# Patient Record
Sex: FEMALE | Race: BLACK OR AFRICAN AMERICAN | Employment: OTHER | URBAN - METROPOLITAN AREA
[De-identification: names, ages, dates, MRNs, and addresses within clinical notes are randomized per-mention and may not be internally consistent; named-entity substitution may affect disease eponyms.]

---

## 2017-03-28 ENCOUNTER — HOSPITAL ENCOUNTER (OUTPATIENT)
Dept: MAMMOGRAPHY | Age: 62
Discharge: HOME | End: 2017-03-28
Attending: NURSE PRACTITIONER
Payer: COMMERCIAL

## 2017-03-28 DIAGNOSIS — Z12.31 VISIT FOR SCREENING MAMMOGRAM: ICD-10-CM

## 2017-03-28 PROCEDURE — 77067 SCR MAMMO BI INCL CAD: CPT

## 2018-08-21 RX ORDER — ASPIRIN 500 MG
250 TABLET, DELAYED RELEASE (ENTERIC COATED) ORAL DAILY
COMMUNITY

## 2018-08-21 NOTE — PERIOP NOTES
PAT - SURGICAL PRE-ADMISSION INSTRUCTIONS    NAME:  Duarte Ricketts                                                          TODAY'S DATE:  8/21/2018    SURGERY DATE:  8/24/2018                                  SURGERY ARRIVAL TIME:   0915    1. Do NOT eat or drink anything, including candy or gum, after MIDNIGHT on 8/23/18 , unless you have specific instructions from your Surgeon or Anesthesia Provider to do so. 2. No smoking on the day of surgery. 3. No alcohol 24 hours prior to the day of surgery. 4. No recreational drugs for one week prior to the day of surgery. 5. Leave all valuables, including money/purse, at home. 6. Remove all jewelry, nail polish, makeup (including mascara); no lotions, powders, deodorant, or perfume/cologne/after shave. 7. Glasses/Contact lenses and Dentures may be worn to the hospital.  They will be removed prior to surgery. 8. Call your doctor if symptoms of a cold or illness develop within 24 ours prior to surgery. 9. AN ADULT MUST DRIVE YOU HOME AFTER OUTPATIENT SURGERY. 10. If you are having an OUTPATIENT procedure, please make arrangements for a responsible adult to be with you for 24 hours after your surgery. 11. If you are admitted to the hospital, you will be assigned to a bed after surgery is complete. Normally a family member will not be able to see you until you are in your assigned bed. 15. Family is encouraged to accompany you to the hospital.  We ask visitors in the treatment area to be limited to ONE person at a time to ensure patient privacy. EXCEPTIONS WILL BE MADE AS NEEDED. 15. Children under 12 are discouraged from entering the treatment area and need to be supervised by an adult when in the waiting room. Special Instructions:    Bring inhaler., HOLD oral diabetic medication on the MORNING OF surgery. , Wear your HOME insulin pump to the hospital, set at the [address].   On the morning of surgery, the continued use of the pump will be determined by the type of surgery procedure/expected length of surgery/positioning/equipment expected. Bring EXTRA infusion supplies with you to the hospital. , Complete bowel prep per MD instructions., STOP anticoagulants AT LEAST 1 WEEK PRIOR to your surgery or, follow other MD instructions:  PLAVIX & ASPIRIN    Patient Prep:    shower with anti-bacterial soap    These surgical instructions were reviewed with PATIENT during the PAT PHONE CALL. Directions: On the morning of surgery, please go to the [facility]. Enter the building from the [facility] entrance, 1st floor (next to the Emergency Room entrance). Take the elevator to the 2nd floor. Sign in at the Registration Desk.     If you have any questions and/or concerns, please do not hesitate to call:  (Prior to the day of surgery)  [facility] unit:  [phone]  (Day of surgery)  [facility] unit:  [phone]

## 2018-08-23 ENCOUNTER — ANESTHESIA EVENT (OUTPATIENT)
Dept: ENDOSCOPY | Age: 63
End: 2018-08-23
Payer: COMMERCIAL

## 2018-08-24 ENCOUNTER — ANESTHESIA (OUTPATIENT)
Dept: ENDOSCOPY | Age: 63
End: 2018-08-24
Payer: COMMERCIAL

## 2018-08-24 ENCOUNTER — HOSPITAL ENCOUNTER (OUTPATIENT)
Age: 63
Setting detail: OUTPATIENT SURGERY
Discharge: HOME | End: 2018-08-24
Attending: INTERNAL MEDICINE | Admitting: INTERNAL MEDICINE
Payer: COMMERCIAL

## 2018-08-24 VITALS
DIASTOLIC BLOOD PRESSURE: 55 MMHG | RESPIRATION RATE: 16 BRPM | WEIGHT: 253 LBS | SYSTOLIC BLOOD PRESSURE: 99 MMHG | BODY MASS INDEX: 43.19 KG/M2 | HEART RATE: 85 BPM | OXYGEN SATURATION: 95 % | TEMPERATURE: 97.6 F | HEIGHT: 64 IN

## 2018-08-24 PROBLEM — Z12.11 SCREENING FOR COLON CANCER: Status: ACTIVE | Noted: 2018-08-24

## 2018-08-24 LAB
BUN BLD-MCNC: 24 MG/DL (ref 7–18)
CHLORIDE BLD-SCNC: 98 MMOL/L (ref 100–108)
GLUCOSE BLD STRIP.AUTO-MCNC: 128 MG/DL (ref 74–106)
HCT VFR BLD CALC: 34 % (ref 36–49)
HGB BLD-MCNC: 11.6 G/DL (ref 12–16)
POTASSIUM BLD-SCNC: 3.5 MMOL/L (ref 3.5–5.5)
SODIUM BLD-SCNC: 140 MMOL/L (ref 136–145)

## 2018-08-24 PROCEDURE — 77030031670 HC DEV INFL ENDOTEK BIG60 MRTM -B: Performed by: INTERNAL MEDICINE

## 2018-08-24 PROCEDURE — 74011250636 HC RX REV CODE- 250/636

## 2018-08-24 PROCEDURE — 74011250636 HC RX REV CODE- 250/636: Performed by: NURSE ANESTHETIST, CERTIFIED REGISTERED

## 2018-08-24 PROCEDURE — 76040000019: Performed by: INTERNAL MEDICINE

## 2018-08-24 PROCEDURE — 76060000031 HC ANESTHESIA FIRST 0.5 HR: Performed by: INTERNAL MEDICINE

## 2018-08-24 PROCEDURE — 84295 ASSAY OF SERUM SODIUM: CPT

## 2018-08-24 RX ORDER — SODIUM CHLORIDE 9 MG/ML
25 INJECTION, SOLUTION INTRAVENOUS CONTINUOUS
Status: CANCELLED | OUTPATIENT
Start: 2018-08-24 | End: 2018-08-24

## 2018-08-24 RX ORDER — SODIUM CHLORIDE 0.9 % (FLUSH) 0.9 %
5-10 SYRINGE (ML) INJECTION AS NEEDED
Status: CANCELLED | OUTPATIENT
Start: 2018-08-24 | End: 2018-08-24

## 2018-08-24 RX ORDER — INSULIN LISPRO 100 [IU]/ML
INJECTION, SOLUTION INTRAVENOUS; SUBCUTANEOUS ONCE
Status: DISCONTINUED | OUTPATIENT
Start: 2018-08-25 | End: 2018-08-24 | Stop reason: HOSPADM

## 2018-08-24 RX ORDER — PROPOFOL 10 MG/ML
INJECTION, EMULSION INTRAVENOUS
Status: DISCONTINUED | OUTPATIENT
Start: 2018-08-24 | End: 2018-08-24 | Stop reason: HOSPADM

## 2018-08-24 RX ORDER — SODIUM CHLORIDE 0.9 % (FLUSH) 0.9 %
5-10 SYRINGE (ML) INJECTION AS NEEDED
Status: DISCONTINUED | OUTPATIENT
Start: 2018-08-24 | End: 2018-08-24 | Stop reason: HOSPADM

## 2018-08-24 RX ORDER — SODIUM CHLORIDE, SODIUM LACTATE, POTASSIUM CHLORIDE, CALCIUM CHLORIDE 600; 310; 30; 20 MG/100ML; MG/100ML; MG/100ML; MG/100ML
50 INJECTION, SOLUTION INTRAVENOUS CONTINUOUS
Status: DISCONTINUED | OUTPATIENT
Start: 2018-08-25 | End: 2018-08-24 | Stop reason: HOSPADM

## 2018-08-24 RX ORDER — SODIUM CHLORIDE 0.9 % (FLUSH) 0.9 %
5-10 SYRINGE (ML) INJECTION EVERY 8 HOURS
Status: DISCONTINUED | OUTPATIENT
Start: 2018-08-24 | End: 2018-08-24 | Stop reason: HOSPADM

## 2018-08-24 RX ORDER — PROPOFOL 10 MG/ML
INJECTION, EMULSION INTRAVENOUS AS NEEDED
Status: DISCONTINUED | OUTPATIENT
Start: 2018-08-24 | End: 2018-08-24 | Stop reason: HOSPADM

## 2018-08-24 RX ORDER — FAMOTIDINE 20 MG/1
20 TABLET, FILM COATED ORAL ONCE
Status: DISCONTINUED | OUTPATIENT
Start: 2018-08-25 | End: 2018-08-24 | Stop reason: HOSPADM

## 2018-08-24 RX ORDER — SODIUM CHLORIDE 0.9 % (FLUSH) 0.9 %
5-10 SYRINGE (ML) INJECTION EVERY 8 HOURS
Status: CANCELLED | OUTPATIENT
Start: 2018-08-24 | End: 2018-08-24

## 2018-08-24 RX ADMIN — PROPOFOL 40 MG: 10 INJECTION, EMULSION INTRAVENOUS at 10:36

## 2018-08-24 RX ADMIN — SODIUM CHLORIDE, SODIUM LACTATE, POTASSIUM CHLORIDE, AND CALCIUM CHLORIDE 50 ML/HR: 600; 310; 30; 20 INJECTION, SOLUTION INTRAVENOUS at 09:35

## 2018-08-24 RX ADMIN — PROPOFOL 160 MCG/KG/MIN: 10 INJECTION, EMULSION INTRAVENOUS at 10:36

## 2018-08-24 NOTE — DISCHARGE INSTRUCTIONS
COLONOSCOPY DISCHARGE INSTRUCTIONS    You have just had an examination of your colon (large intestine). The medication given to you during your procedure will be acting in your body for the next 12 hours, gradually wearing off. Your face may be flushed, skin may be warm and sweaty, you might feel a little bloated or nauseated and sleepy. Please be aware of the followin. For the next 12 hours you SHOULD NOT:    a. Drive, Operate any machinery. b. Drink alcohol.  c. Engage in activities that require mental sharpness or manual dexterity such as cooking. d. Take any medications other than prescribed by a physician.  e. Make any legal or financial decisions. 2. Call this office immediately, if:    a. Onset of new or increase of maureen rectal bleeding.  b. Fever > 101  c. Increased abdominal pain    Additional instructions:    a. Diet as recommended  b. Due to risk of dehydration after colon prep and sedation, avoid extended periods of time outdoors if the day is hot and humid. c. If biopsies were taken, you will receive a post card or telephone call with results of your biopsies and suggestion for your next colonoscopy. Please allow us at least 3 weeks to get back with you about your results. If we have not contacted you by the, please call us for results. Ph# [phone]  d. If you had polyp(s) removed DO NOT TAKE NSAIDS (Aspirin, Advil, Aleve, Naproxyn, Ibuprofen, etc) for 2 weeks. Tylenol is OK to use. DISCHARGE SUMMARY from Nurse    PATIENT INSTRUCTIONS:    After general anesthesia or intravenous sedation, for 24 hours or while taking prescription Narcotics:  · Limit your activities  · Do not drive and operate hazardous machinery  · Do not make important personal or business decisions  · Do  not drink alcoholic beverages  · If you have not urinated within 8 hours after discharge, please contact your surgeon on call.     Report the following to your surgeon:  · Excessive pain, swelling, redness or odor of or around the surgical area  · Temperature over 100.5  · Nausea and vomiting lasting longer than 4 hours or if unable to take medications  · Any signs of decreased circulation or nerve impairment to extremity: change in color, persistent  numbness, tingling, coldness or increase pain  · Any questions    What to do at Home:  Recommended activity: Activity as tolerated and no driving for today,       These are general instructions for a healthy lifestyle:    No smoking/ No tobacco products/ Avoid exposure to second hand smoke  Surgeon General's Warning:  Quitting smoking now greatly reduces serious risk to your health. Obesity, smoking, and sedentary lifestyle greatly increases your risk for illness    A healthy diet, regular physical exercise & weight monitoring are important for maintaining a healthy lifestyle    You may be retaining fluid if you have a history of heart failure or if you experience any of the following symptoms:  Weight gain of 3 pounds or more overnight or 5 pounds in a week, increased swelling in our hands or feet or shortness of breath while lying flat in bed. Please call your doctor as soon as you notice any of these symptoms; do not wait until your next office visit. Recognize signs and symptoms of STROKE:    F-face looks uneven    A-arms unable to move or move unevenly    S-speech slurred or non-existent    T-time-call 911 as soon as signs and symptoms begin-DO NOT go       Back to bed or wait to see if you get better-TIME IS BRAIN. Warning Signs of HEART ATTACK     Call 911 if you have these symptoms:   Chest discomfort. Most heart attacks involve discomfort in the center of the chest that lasts more than a few minutes, or that goes away and comes back. It can feel like uncomfortable pressure, squeezing, fullness, or pain.  Discomfort in other areas of the upper body.  Symptoms can include pain or discomfort in one or both arms, the back, neck, jaw, or stomach.  Shortness of breath with or without chest discomfort.  Other signs may include breaking out in a cold sweat, nausea, or lightheadedness. Don't wait more than five minutes to call 911 - MINUTES MATTER! Fast action can save your life. Calling 911 is almost always the fastest way to get lifesaving treatment. Emergency Medical Services staff can begin treatment when they arrive -- up to an hour sooner than if someone gets to the hospital by car. The discharge information has been reviewed with the patient. The patient verbalized understanding. Discharge medications reviewed with the patient and appropriate educational materials and side effects teaching were provided. Patient armband removed and given to patient to take home.   Patient was informed of the privacy risks if armband lost or stolen    ___________________________________________________________________________________________________________________________________

## 2018-08-24 NOTE — IP AVS SNAPSHOT
[address] [address] Munira Wright  
[phone] Patient: Samara Caraballo MRN: [id number] About your hospitalization You were admitted on:  August 24, 2018 You last received care in the:  [facility] PHASE 2 RECOVERY You were discharged on:  August 24, 2018 Why you were hospitalized Your primary diagnosis was:  Screening For Colon Cancer Follow-up Information Follow up With Details Comments Contact Info MD Norbert Th[address]. [address]  
[address] [facility] [address] 
[phone] Rehana Em MD  As needed [facility] [address] [address] 
[phone] Discharge Orders None A check zuleima indicates which time of day the medication should be taken. My Medications CONTINUE taking these medications Instructions Each Dose to Equal  
 Morning Noon Evening Bedtime  
 aspirin  mg tablet Your last dose was: Your next dose is: Take 250 mg by mouth daily. 250 mg  
    
   
   
   
  
 BYDUREON 2 mg Serr Generic drug:  exenatide microspheres Your last dose was: Your next dose is:    
   
   
 2 mg by SubCUTAneous route every seven (7) days. 2 mg  
    
   
   
   
  
 glimepiride 4 mg tablet Commonly known as:  AMARYL Your last dose was: Your next dose is: Take 4 mg by mouth every morning. 4 mg LANTUS U-100 INSULIN 100 unit/mL injection Generic drug:  insulin glargine Your last dose was: Your next dose is:    
   
   
 39 Units by SubCUTAneous route nightly. 39 Units LASIX PO Your last dose was: Your next dose is: Take 40 mg by mouth daily. 40 mg  
    
   
   
   
  
 losartan-hydroCHLOROthiazide 100-25 mg per tablet Commonly known as:  HYZAAR Your last dose was: Your next dose is: Take 1 Tab by mouth daily. 1 Tab PLAVIX 75 mg Tab Generic drug:  clopidogrel Your last dose was: Your next dose is: Take 75 mg by mouth daily. 75 mg  
    
   
   
   
  
 simvastatin 20 mg tablet Commonly known as:  ZOCOR Your last dose was: Your next dose is: Take 20 mg by mouth nightly. 20 mg  
    
   
   
   
  
 SINGULAIR 10 mg tablet Generic drug:  montelukast  
   
Your last dose was: Your next dose is: Take 10 mg by mouth daily. 10 mg  
    
   
   
   
  
 spironolactone 25 mg tablet Commonly known as:  ALDACTONE Your last dose was: Your next dose is: Take 25 mg by mouth daily. 25 mg  
    
   
   
   
  
 SYMBICORT 160-4.5 mcg/actuation Hfaa Generic drug:  budesonide-formoterol Your last dose was: Your next dose is: Take 2 Puffs by inhalation two (2) times daily as needed. 2 Puff VENTOLIN HFA 90 mcg/actuation inhaler Generic drug:  albuterol Your last dose was: Your next dose is: Take 2 Puffs by inhalation every four (4) hours as needed for Wheezing. Inhale 1-2 puffs every 4-6 hours as needed. 2 Puff Discharge Instructions COLONOSCOPY DISCHARGE INSTRUCTIONS You have just had an examination of your colon (large intestine). The medication given to you during your procedure will be acting in your body for the next 12 hours, gradually wearing off. Your face may be flushed, skin may be warm and sweaty, you might feel a little bloated or nauseated and sleepy. Please be aware of the followin. For the next 12 hours you SHOULD NOT: 
 
a. Drive, Operate any machinery. b. Drink alcohol. 
c. Engage in activities that require mental sharpness or manual dexterity such as cooking. d. Take any medications other than prescribed by a physician. 
e. Make any legal or financial decisions. 2. Call this office immediately, if: 
 
a. Onset of new or increase of maureen rectal bleeding. 
b. Fever > 101 
c. Increased abdominal pain Additional instructions: 
 
a. Diet as recommended 
b. Due to risk of dehydration after colon prep and sedation, avoid extended periods of time outdoors if the day is hot and humid. c. If biopsies were taken, you will receive a post card or telephone call with results of your biopsies and suggestion for your next colonoscopy. Please allow us at least 3 weeks to get back with you about your results. If we have not contacted you by the, please call us for results. Ph# [phone] 
d. If you had polyp(s) removed DO NOT TAKE NSAIDS (Aspirin, Advil, Aleve, Naproxyn, Ibuprofen, etc) for 2 weeks. Tylenol is OK to use. DISCHARGE SUMMARY from Nurse PATIENT INSTRUCTIONS: 
 
After general anesthesia or intravenous sedation, for 24 hours or while taking prescription Narcotics: · Limit your activities · Do not drive and operate hazardous machinery · Do not make important personal or business decisions · Do  not drink alcoholic beverages · If you have not urinated within 8 hours after discharge, please contact your surgeon on call. Report the following to your surgeon: 
· Excessive pain, swelling, redness or odor of or around the surgical area · Temperature over 100.5 · Nausea and vomiting lasting longer than 4 hours or if unable to take medications · Any signs of decreased circulation or nerve impairment to extremity: change in color, persistent  numbness, tingling, coldness or increase pain · Any questions What to do at Home: 
Recommended activity: Activity as tolerated and no driving for today, These are general instructions for a healthy lifestyle: No smoking/ No tobacco products/ Avoid exposure to second hand smoke Surgeon General's Warning:  Quitting smoking now greatly reduces serious risk to your health. Obesity, smoking, and sedentary lifestyle greatly increases your risk for illness A healthy diet, regular physical exercise & weight monitoring are important for maintaining a healthy lifestyle You may be retaining fluid if you have a history of heart failure or if you experience any of the following symptoms:  Weight gain of 3 pounds or more overnight or 5 pounds in a week, increased swelling in our hands or feet or shortness of breath while lying flat in bed. Please call your doctor as soon as you notice any of these symptoms; do not wait until your next office visit. Recognize signs and symptoms of STROKE: 
 
F-face looks uneven A-arms unable to move or move unevenly S-speech slurred or non-existent T-time-call 911 as soon as signs and symptoms begin-DO NOT go Back to bed or wait to see if you get better-TIME IS BRAIN. Warning Signs of HEART ATTACK Call 911 if you have these symptoms: 
? Chest discomfort. Most heart attacks involve discomfort in the center of the chest that lasts more than a few minutes, or that goes away and comes back. It can feel like uncomfortable pressure, squeezing, fullness, or pain. ? Discomfort in other areas of the upper body. Symptoms can include pain or discomfort in one or both arms, the back, neck, jaw, or stomach. ? Shortness of breath with or without chest discomfort. ? Other signs may include breaking out in a cold sweat, nausea, or lightheadedness. Don't wait more than five minutes to call [address]! Fast action can save your life. Calling 911 is almost always the fastest way to get lifesaving treatment. Emergency Medical Services staff can begin treatment when they arrive  up to an hour sooner than if someone gets to the hospital by car. The discharge information has been reviewed with the patient.   The patient verbalized understanding. Discharge medications reviewed with the patient and appropriate educational materials and side effects teaching were provided. Patient armband removed and given to patient to take home. Patient was informed of the privacy risks if armband lost or stolen 
 
___________________________________________________________________________________________________________________________________ Introducing [facility]! Harshal Ballard introduces [employer] patient portal. Now you can access parts of your medical record, email your doctor's office, and request medication refills online. 1. In your internet browser, go to [email or web address] 2. Click on the First Time User? Click Here link in the Sign In box. You will see the New Member Sign Up page. 3. Enter your [employer] Access Code exactly as it appears below. You will not need to use this code after youve completed the sign-up process. If you do not sign up before the expiration date, you must request a new code. · [employer] Access Code: [email or web address] Expires: 11/22/2018 11:04 AM 
 
4. Enter the last four digits of your Social Security Number (xxxx) and Date of Birth (mm/dd/yyyy) as indicated and click Submit. You will be taken to the next sign-up page. 5. Create a Pipelinet ID. This will be your [employer] login ID and cannot be changed, so think of one that is secure and easy to remember. 6. Create a [employer] password. You can change your password at any time. 7. Enter your Password Reset Question and Answer. This can be used at a later time if you forget your password. 8. Enter your e-mail address. You will receive e-mail notification when new information is available in [address] 9. Click Sign Up. You can now view and download portions of your medical record. 10. Click the Download Summary menu link to download a portable copy of your medical information. If you have questions, please visit the Frequently Asked Questions section of the MyChart website. Remember, [employer] is NOT to be used for urgent needs. For medical emergencies, dial 911. Now available from your iPhone and Android! Introducing Winston Hutton As a [facility] patient, I wanted to make you aware of our electronic visit tool called Winston Hutton. [employer] allows you to connect within minutes with a medical provider 24 hours a day, seven days a week via a mobile device or tablet or logging into a secure website from your computer. You can access Winston Hutton from anywhere in the [place]. A virtual visit might be right for you when you have a simple condition and feel like you just dont want to get out of bed, or cant get away from work for an appointment, when your regular [facility] provider is not available (evenings, weekends or holidays), or when youre out of town and need minor care. Electronic visits cost only $49 and if the [facility] provider determines a prescription is needed to treat your condition, one can be electronically transmitted to a nearby pharmacy*. Please take a moment to enroll today if you have not already done so. The enrollment process is free and takes just a few minutes. To enroll, please download the [employer] felipe to your tablet or phone, or visit [email or web address] to enroll on your computer. And, as an [address] [place] [address] patient with a [employer] account, the results of your visits will be scanned into your electronic medical record and your primary care provider will be able to view the scanned results. We urge you to continue to see your regular [facility] provider for your ongoing medical care.   And while your primary care provider may not be the one available when you seek a Winston Hutton virtual visit, the peace of mind you get from getting a real diagnosis real time can be priceless. For more information on Winston Hutton, view our Frequently Asked Questions (FAQs) at [email or web address]. Sincerely, 
 
Katharina Reyes MD 
Chief Medical Officer [address] *:  certain medications cannot be prescribed via Winston Hutton Providers Seen During Your Hospitalization Provider Specialty Primary office phone Lisset Carrera MD Gastroenterology [phone] Your Primary Care Physician (PCP) Primary Care Physician Office Phone Office Fax Fantasma Bernal [phone] You are allergic to the following Allergen Reactions Pcn (Penicillins) Swelling Shellfish Derived Other (comments) Recent Documentation Height Weight BMI OB Status Smoking Status 1.626 m 114.8 kg 43.43 kg/m2 Postmenopausal Never Smoker Emergency Contacts Name Discharge Info Relation Home Work Mobile Adriana,Zeferino DISCHARGE CAREGIVER [3] Spouse [3] [phone] DuboisJack morris  Child [2] [phone] Patient Belongings The following personal items are in your possession at time of discharge: 
  Dental Appliances: Uppers  Visual Aid: Glasses Please provide this summary of care documentation to your next provider. Signatures-by signing, you are acknowledging that this After Visit Summary has been reviewed with you and you have received a copy. Patient Signature:  ____________________________________________________________ Date:  ____________________________________________________________  
  
Elizabeth Reynolds Provider Signature:  ____________________________________________________________ Date:  ____________________________________________________________

## 2018-08-24 NOTE — PERIOP NOTES
Phase 2 Recovery Summary  Patient arrived to Phase 2 at 1056  Report received from [place]:    08/21/18 1157 08/24/18 0918 08/24/18 0929 08/24/18 1057   BP:   137/76 99/55   Pulse:   84 85   Resp:   18 16   Temp:   97.6 °F (36.4 °C)    SpO2:    95%   Weight: 115.2 kg (254 lb) 114.8 kg (253 lb)     Height: 5' 4\" (1.626 m) 5' 4\" (1.626 m)         oriented to time, place, person and situation    Lines and Drains  Peripheral Intravenous Line:      Wound         Patient denies pain/discomfort. [phone]- Dr. Melinda Fernando at bedside speaking with patient and family about findings. Discharge instructions given, questions and concerns addressed.    Patient discharged to home with daughter Johnnie Conway  at [address] [facility]

## 2018-08-24 NOTE — PROCEDURES
Colonoscopy Procedure Note    Patient: Samara Caraballo MRN: [id number]  SSN: [id number]    YOB: 1955  Age: 58 y.o. Sex: female      Date of Procedure: 8/24/2018      Procedures:  COLONOSCOPY:   HISTORY UPDATE: History and physical has been reviewed. The patient has been examined. There have been no significant clinical changes since the completion of the originally dated History and Physical.   INDICATION:    Screening colonoscopy   PROCEDURE PERFORMED:Colonoscopy was complete to cecum. ENDOSCOPIST: Rehana Em MD   ASSISTANT:  Endoscopy Technician-1: Gina Womack  Endoscopy RN-1: Rudy Cruz RN   CLASSIFICATION OF PREOPERATIVE RISK: ASA 2 - Patient with mild systemic disease with no functional limitations   ANESTHESIA:  MAC anesthesia   ENDOSCOPE: [id number]                                                                                                        EXTENT OF EXAM: Cecum    QUALITY OF COLON PREPARATION:  good     DESCRIPTION OF PROCEDURE:  The procedure was discussed with the     patient including but not limited to IV conscious sedation, bleeding, perforation and missed polyps and the consent form was signed and witnessed. A safety timeout was performed. Procedure done with MAC. The patient's vitals were monitored at all times, including heart rate and rhythm, oxygen saturation, and blood pressure. The patient was then placed into the left lateral decubitus position. A rectal exam was performed. The Olympus Adult diagnostic colonscope was then passed under direct visualization with ease to the cecum. The cecum was identified by landmarks including Ileocecal valve, appendiceal orifice and crow's foot. The scope was then slowly withdrawn while closely visualizing the mucosa in the rectum a retroflection was performed and distal rectum visualized. The scope was then removed. The patient was transferred to the recovery room in stable condition.       FINDINGS:1. Mild diverticulosis seen on the left side of the colon 2. No hemorrhoids seen on retroflexion 3. Colonic mucosa without any inflammation, masses, vascular lesions. EBL: None   SPECIMENS: None   IMPRESSION:1. Mild diverticulosis seen on the left side of the colon 2. No hemorrhoids seen on retroflexion 3. Colonic mucosa without any inflammation, masses, vascular lesions.      RECOMMENDATIONS:               1.    High fibre diet                2.   Repeat colonoscopy in 10 yrs if no change in personal or family history                  Follow Up:   As needed       Puja Simmons MD   8/24/2018

## 2018-08-24 NOTE — H&P
Date of Surgery Update:  Katherine Ludwig was seen and examined. History and physical has been reviewed. The patient has been examined.  There have been no significant clinical changes since the completion of the originally dated History and Physical.    Signed By: Albin Alejandra MD     August 24, 2018 10:24 AM

## 2018-08-24 NOTE — ANESTHESIA POSTPROCEDURE EVALUATION
Post-Anesthesia Evaluation and Assessment    Patient: Sylvia Curtis MRN: [id number]  SSN: [id number]    YOB: 1955  Age: 58 y.o. Sex: female       Cardiovascular Function/Vital Signs  Visit Vitals    BP 99/55    Pulse 85    Temp 36.4 °C (97.6 °F)    Resp 18    Ht 5' 4\" (1.626 m)    Wt 114.8 kg (253 lb)    SpO2 95%    BMI 43.43 kg/m2       Patient is status post MAC anesthesia for Procedure(s):  COLONOSCOPY. Nausea/Vomiting: None    Postoperative hydration reviewed and adequate. Pain:  Pain Scale 1: Numeric (0 - 10) (08/24/18 0918)  Pain Intensity 1: 0 (08/24/18 2758)   Managed    Neurological Status: At baseline    Mental Status and Level of Consciousness: Alert and oriented     Pulmonary Status:   O2 Device: Room air (08/24/18 1057)   Adequate oxygenation and airway patent    Complications related to anesthesia: None    Post-anesthesia assessment completed.  No concerns    Signed By: Bubba Foster CRNA     August 24, 2018

## 2018-08-24 NOTE — ANESTHESIA PREPROCEDURE EVALUATION
Anesthetic History   No history of anesthetic complications            Review of Systems / Medical History  Patient summary reviewed and pertinent labs reviewed    Pulmonary  Within defined limits  COPD: moderate    Sleep apnea: CPAP           Neuro/Psych   Within defined limits    CVA       Cardiovascular  Within defined limits  Hypertension        Dysrhythmias : SVT and atrial fibrillation      Exercise tolerance: <4 METS     GI/Hepatic/Renal  Within defined limits              Endo/Other  Within defined limits  Diabetes: type 2    Morbid obesity     Other Findings   Comments: Documentation of current medication  Current medications obtained, documented and obtained? YES      Risk Factors for Postoperative nausea/vomiting:       History of postoperative nausea/vomiting? NO       Female? YES       Motion sickness? NO       Intended opioid administration for postoperative analgesia? NO      Smoking Abstinence:  Current Smoker? NO  Elective Surgery? YES  Seen preoperatively by anesthesiologist or proxy prior to day of surgery? YES  Pt abstained from smoking 24 hours prior to anesthesia?  YES    Preventive care/screening for High Blood Pressure:  Aged 18 years and older: YES  Screened for high blood pressure: YES  Patients with high blood pressure referred to primary care provider   for BP management: YES                     Physical Exam    Airway  Mallampati: II  TM Distance: 4 - 6 cm  Neck ROM: normal range of motion   Mouth opening: Normal     Cardiovascular    Rhythm: regular  Rate: normal         Dental    Dentition: Full upper dentures     Pulmonary  Breath sounds clear to auscultation               Abdominal  GI exam deferred       Other Findings            Anesthetic Plan    ASA: 3  Anesthesia type: MAC          Induction: Intravenous  Anesthetic plan and risks discussed with: Patient

## 2018-11-14 ENCOUNTER — HOSPITAL ENCOUNTER (OUTPATIENT)
Dept: GENERAL RADIOLOGY | Age: 63
Discharge: HOME | End: 2018-11-14
Payer: COMMERCIAL

## 2018-11-14 DIAGNOSIS — M79.672 LEFT FOOT PAIN: ICD-10-CM

## 2018-11-14 PROCEDURE — 73630 X-RAY EXAM OF FOOT: CPT

## 2019-07-19 ENCOUNTER — HOSPITAL ENCOUNTER (OUTPATIENT)
Dept: LAB | Age: 64
Discharge: HOME | End: 2019-07-19

## 2019-07-19 LAB — SENTARA SPECIMEN COL,SENBCF: NORMAL

## 2019-07-19 PROCEDURE — 99001 SPECIMEN HANDLING PT-LAB: CPT

## 2019-09-24 PROBLEM — Z12.11 SCREENING FOR COLON CANCER: Status: RESOLVED | Noted: 2018-08-24 | Resolved: 2019-09-24

## 2020-02-27 ENCOUNTER — APPOINTMENT (OUTPATIENT)
Dept: GENERAL RADIOLOGY | Age: 65
End: 2020-02-27
Attending: EMERGENCY MEDICINE
Payer: COMMERCIAL

## 2020-02-27 ENCOUNTER — HOSPITAL ENCOUNTER (EMERGENCY)
Age: 65
Discharge: HOME | End: 2020-02-27
Attending: EMERGENCY MEDICINE
Payer: COMMERCIAL

## 2020-02-27 VITALS
TEMPERATURE: 98.8 F | WEIGHT: 235 LBS | BODY MASS INDEX: 40.12 KG/M2 | HEIGHT: 64 IN | HEART RATE: 86 BPM | RESPIRATION RATE: 18 BRPM | OXYGEN SATURATION: 93 % | SYSTOLIC BLOOD PRESSURE: 131 MMHG | DIASTOLIC BLOOD PRESSURE: 66 MMHG

## 2020-02-27 DIAGNOSIS — M53.3 SACRO ILIAL PAIN: Primary | ICD-10-CM

## 2020-02-27 PROCEDURE — 99282 EMERGENCY DEPT VISIT SF MDM: CPT

## 2020-02-27 PROCEDURE — 73502 X-RAY EXAM HIP UNI 2-3 VIEWS: CPT

## 2020-02-27 RX ORDER — OXYCODONE AND ACETAMINOPHEN 5; 325 MG/1; MG/1
1 TABLET ORAL
Qty: 10 TAB | Refills: 0 | Status: SHIPPED | OUTPATIENT
Start: 2020-02-27 | End: 2020-03-01

## 2020-02-27 RX ORDER — SENNOSIDES 25 MG/1
TABLET, FILM COATED ORAL
Qty: 1 TUBE | Refills: 0 | Status: SHIPPED | OUTPATIENT
Start: 2020-02-27

## 2020-02-27 NOTE — ED PROVIDER NOTES
EMERGENCY DEPARTMENT HISTORY AND PHYSICAL EXAM    Date: 2/27/2020  Patient Name: Gavi Mary    History of Presenting Illness     Chief Complaint   Patient presents with    Hip Pain         History Provided By: Patient      Additional History (Context): Gavi Mary is a 59 y.o. female with obesity who presents with right hip pain. She said her pain began couple of days ago but is worse today. Pain is in her posterior right hip. Has a history of chronic low back pain but felt like things migrated into her right hip today. Denies any trauma fever IV drug use or rash. She denies saddle anesthesia bowel incontinence or sciatica rectal pain unintentional weight loss in the past 6 months or urinary retention. Has a history of scoliosis. PCP: Smiley Block MD    Current Outpatient Medications   Medication Sig Dispense Refill    lidocaine 5 % topical cream Apply  to affected area two (2) times daily as needed for Pain. 1 Tube 0    oxyCODONE-acetaminophen (PERCOCET) 5-325 mg per tablet Take 1 Tab by mouth every six (6) hours as needed for Pain for up to 3 days. Max Daily Amount: 4 Tabs. Take 1 tablet every 6 hours as needed for pain control. 10 Tab 0    aspirin  mg tablet Take 250 mg by mouth daily.  FUROSEMIDE (LASIX PO) Take 40 mg by mouth daily.  albuterol (VENTOLIN HFA) 90 mcg/actuation inhaler Take 2 Puffs by inhalation every four (4) hours as needed for Wheezing. Inhale 1-2 puffs every 4-6 hours as needed.  losartan-hydrochlorothiazide (HYZAAR) 100-25 mg per tablet Take 1 Tab by mouth daily.  montelukast (SINGULAIR) 10 mg tablet Take 10 mg by mouth daily.  budesonide-formoterol (SYMBICORT) 160-4.5 mcg/actuation HFA inhaler Take 2 Puffs by inhalation two (2) times daily as needed.  clopidogrel (PLAVIX) 75 mg tablet Take 75 mg by mouth daily.  spironolactone (ALDACTONE) 25 mg tablet Take 25 mg by mouth daily.       glimepiride (AMARYL) 4 mg tablet Take 4 mg by mouth every morning.  simvastatin (ZOCOR) 20 mg tablet Take 20 mg by mouth nightly.  insulin glargine (LANTUS) 100 unit/mL injection 39 Units by SubCUTAneous route nightly.  exenatide microspheres (BYDUREON) 2 mg serr 2 mg by SubCUTAneous route every seven (7) days. Past History     Past Medical History:  Past Medical History:   Diagnosis Date    COPD     Diabetes     Dyslipidemia     Hypertension     Hypertension     Paroxysmal A-fib ([facility] [address]     PATIENT DENIES ANY HEART PROBLEMS    Sleep apnea     USES C PAP AT TIMES    Stroke ([facility] [address]     PT STATES \"QUESTIONABLE STROKE\"    SVT (supraventricular tachycardia) (HCC)     PATIENT ALSO DENIES       Past Surgical History:  Past Surgical History:   Procedure Laterality Date    COLONOSCOPY N/A 8/24/2018    COL[facility]COPY performed by Tess [facility] MD at [facility] ENDOSCOPY    HX ORTHOPAEDIC      CARPAL TUNNEL       Family History:  Family History   Problem Relation Age of Onset    Breast Cancer Maternal Aunt         not sure of age       Social History:  Social History     Tobacco Use    Smoking status: Never Smoker    Smokeless tobacco: Never Used   Substance Use Topics    Alcohol use: No    Drug use: No       Allergies: Allergies   Allergen Reactions    Pcn [Penicillins] Swelling    Shellfish Derived Other (comments)         Review of Systems   Review of Systems   Constitutional: Negative for fever and unexpected weight change. Gastrointestinal: Negative for abdominal pain. Musculoskeletal: Positive for arthralgias and back pain. Skin: Negative for rash and wound. Neurological: Negative for weakness and numbness. All Other Systems Negative  Physical Exam     Vitals:    02/27/20 1824   BP: 131/66   Pulse: 86   Resp: 18   Temp: 98.8 °F (37.1 °C)   SpO2: 93%   Weight: 106.6 kg (235 lb)   Height: 5' 3.5\" (1.613 m)     Physical Exam  Vitals signs and nursing note reviewed.    Constitutional:       General: She is not in acute distress. Appearance: She is well-developed. She is obese. She is ill-appearing. HENT:      Head: Normocephalic and atraumatic. Eyes:      Pupils: Pupils are equal, round, and reactive to light. Neck:      Thyroid: No thyromegaly. Vascular: No JVD. Trachea: No tracheal deviation. Cardiovascular:      Rate and Rhythm: Normal rate and regular rhythm. Heart sounds: Normal heart sounds. No murmur. No friction rub. No gallop. Pulmonary:      Effort: Pulmonary effort is normal. No respiratory distress. Breath sounds: Normal breath sounds. No stridor. No wheezing or rales. Chest:      Chest wall: No tenderness. Abdominal:      General: There is distension. Palpations: Abdomen is soft. There is no mass. Tenderness: There is no abdominal tenderness. There is no guarding or rebound. Comments: No pulsatile mass palpated. Distention secondary to chronic body habitus. Musculoskeletal:         General: Tenderness present. Comments: Right SI joint tenderness to palpation. Negative straight leg raise. Lymphadenopathy:      Cervical: No cervical adenopathy. Skin:     General: Skin is warm and dry. Coloration: Skin is not pale. Findings: No erythema or rash. Neurological:      Mental Status: She is alert and oriented to person, place, and time. Psychiatric:         Behavior: Behavior normal.         Thought Content: Thought content normal.                Diagnostic Study Results     Labs -   No results found for this or any previous visit (from the past 12 hour(s)). Radiologic Studies -   XR HIP RT W OR WO PELV 2-3 VWS    (Results Pending)     CT Results  (Last 48 hours)    None        CXR Results  (Last 48 hours)    None            Medical Decision Making   I am the first provider for this patient. I reviewed the vital signs, available nursing notes, past medical history, past surgical history, family history and social history.     Vital Signs-Reviewed the patient's vital signs. Procedures:  Procedures    Provider Notes (Medical Decision Making):   Patient has pain in her sacroiliac joint. X-ray shows nothing acute. Suspect arthritis or biomechanical dysfunction with her scoliosis history. Treat her pain and have her follow-up with Ortho. MED RECONCILIATION:  No current facility-administered medications for this encounter. Current Outpatient Medications   Medication Sig    lidocaine 5 % topical cream Apply  to affected area two (2) times daily as needed for Pain.  oxyCODONE-acetaminophen (PERCOCET) 5-325 mg per tablet Take 1 Tab by mouth every six (6) hours as needed for Pain for up to 3 days. Max Daily Amount: 4 Tabs. Take 1 tablet every 6 hours as needed for pain control.  aspirin  mg tablet Take 250 mg by mouth daily.  FUROSEMIDE (LASIX PO) Take 40 mg by mouth daily.  albuterol (VENTOLIN HFA) 90 mcg/actuation inhaler Take 2 Puffs by inhalation every four (4) hours as needed for Wheezing. Inhale 1-2 puffs every 4-6 hours as needed.  losartan-hydrochlorothiazide (HYZAAR) 100-25 mg per tablet Take 1 Tab by mouth daily.  montelukast (SINGULAIR) 10 mg tablet Take 10 mg by mouth daily.  budesonide-formoterol (SYMBICORT) 160-4.5 mcg/actuation HFA inhaler Take 2 Puffs by inhalation two (2) times daily as needed.  clopidogrel (PLAVIX) 75 mg tablet Take 75 mg by mouth daily.  spironolactone (ALDACTONE) 25 mg tablet Take 25 mg by mouth daily.  glimepiride (AMARYL) 4 mg tablet Take 4 mg by mouth every morning.  simvastatin (ZOCOR) 20 mg tablet Take 20 mg by mouth nightly.  insulin glargine (LANTUS) 100 unit/mL injection 39 Units by SubCUTAneous route nightly.  exenatide microspheres (BYDUREON) 2 mg serr 2 mg by SubCUTAneous route every seven (7) days. Disposition:  home    DISCHARGE NOTE:   6:57 PM    Pt has been reexamined. Patient has no new complaints, changes, or physical findings. Care plan outlined and precautions discussed. Results of x-rays were reviewed with the patient. All medications were reviewed with the patient; will d/c home with see below. All of pt's questions and concerns were addressed. Patient was instructed and agrees to follow up with ortho, as well as to return to the ED upon further deterioration. Patient is ready to go home. Follow-up Information     Follow up With Specialties Details Why Contact Info    Neo Spann MD Orthopedic Surgery Schedule an appointment as soon as possible for a visit in 1 day  [address] DEPT Emergency Medicine  If symptoms worsen return immediately [address]  [phone]          Current Discharge Medication List      START taking these medications    Details   lidocaine 5 % topical cream Apply  to affected area two (2) times daily as needed for Pain. Qty: 1 Tube, Refills: 0      oxyCODONE-acetaminophen (PERCOCET) 5-325 mg per tablet Take 1 Tab by mouth every six (6) hours as needed for Pain for up to 3 days. Max Daily Amount: 4 Tabs. Take 1 tablet every 6 hours as needed for pain control. Qty: 10 Tab, Refills: 0    Associated Diagnoses: Sacro ilial pain               Diagnosis     Clinical Impression:   1.  Sacro ilial pain

## 2020-06-11 ENCOUNTER — HOSPITAL ENCOUNTER (OUTPATIENT)
Dept: MRI IMAGING | Age: 65
Discharge: HOME | End: 2020-06-11
Attending: ORTHOPAEDIC SURGERY
Payer: COMMERCIAL

## 2020-06-11 DIAGNOSIS — M79.606 LEG PAIN: ICD-10-CM

## 2020-06-11 DIAGNOSIS — M54.30 SCIATICA: ICD-10-CM

## 2020-06-11 DIAGNOSIS — M54.9 BACK PAIN: ICD-10-CM

## 2020-06-11 PROCEDURE — 72148 MRI LUMBAR SPINE W/O DYE: CPT

## 2020-08-18 ENCOUNTER — HOSPITAL ENCOUNTER (OUTPATIENT)
Dept: LAB | Age: 65
Discharge: HOME | End: 2020-08-18

## 2020-08-18 ENCOUNTER — HOSPITAL ENCOUNTER (OUTPATIENT)
Dept: CT IMAGING | Age: 65
Discharge: HOME | End: 2020-08-18
Attending: INTERNAL MEDICINE
Payer: COMMERCIAL

## 2020-08-18 DIAGNOSIS — J47.9 BRONCHIECTASIS WITHOUT ACUTE EXACERBATION (HCC): ICD-10-CM

## 2020-08-18 LAB — SENTARA SPECIMEN COL,SENBCF: NORMAL

## 2020-08-18 PROCEDURE — 71250 CT THORAX DX C-: CPT

## 2020-08-18 PROCEDURE — 99001 SPECIMEN HANDLING PT-LAB: CPT

## 2020-09-11 ENCOUNTER — HOSPITAL ENCOUNTER (OUTPATIENT)
Dept: NON INVASIVE DIAGNOSTICS | Age: 65
Discharge: HOME | End: 2020-09-11
Attending: NURSE PRACTITIONER
Payer: COMMERCIAL

## 2020-09-11 ENCOUNTER — APPOINTMENT (OUTPATIENT)
Dept: NON INVASIVE DIAGNOSTICS | Age: 65
End: 2020-09-11
Attending: NURSE PRACTITIONER
Payer: COMMERCIAL

## 2020-09-11 ENCOUNTER — HOSPITAL ENCOUNTER (OUTPATIENT)
Dept: MAMMOGRAPHY | Age: 65
Discharge: HOME | End: 2020-09-11
Attending: INTERNAL MEDICINE
Payer: COMMERCIAL

## 2020-09-11 VITALS
BODY MASS INDEX: 41.64 KG/M2 | DIASTOLIC BLOOD PRESSURE: 66 MMHG | WEIGHT: 235 LBS | HEIGHT: 63 IN | SYSTOLIC BLOOD PRESSURE: 131 MMHG

## 2020-09-11 DIAGNOSIS — R06.02 SHORTNESS OF BREATH: ICD-10-CM

## 2020-09-11 DIAGNOSIS — Z12.31 VISIT FOR SCREENING MAMMOGRAM: ICD-10-CM

## 2020-09-11 LAB
ECHO AO ASC DIAM: 3.38 CM
ECHO AO ROOT DIAM: 3.11 CM
ECHO IVC PROX: 1.75 CM
ECHO LA AREA 4C: 16.06 CM2
ECHO LA VOL 2C: 50.16 ML (ref 22–52)
ECHO LA VOL 4C: 37.4 ML (ref 22–52)
ECHO LA VOL BP: 49.51 ML (ref 22–52)
ECHO LA VOL/BSA BIPLANE: 23.91 ML/M2 (ref 16–28)
ECHO LA VOLUME INDEX A2C: 24.22 ML/M2 (ref 16–28)
ECHO LA VOLUME INDEX A4C: 18.06 ML/M2 (ref 16–28)
ECHO LV E' LATERAL VELOCITY: 10.81 CM/S
ECHO LV E' SEPTAL VELOCITY: 7.93 CM/S
ECHO LVOT DIAM: 2.22 CM
ECHO LVOT PEAK GRADIENT: 2.96 MMHG
ECHO LVOT SV: 68.7 ML
ECHO LVOT VTI: 17.79 CM
ECHO MV A VELOCITY: 77.05 CM/S
ECHO MV E DECELERATION TIME (DT): 0.17 S
ECHO MV E VELOCITY: 62.41 CM/S
ECHO MV E/A RATIO: 0.81
ECHO MV E/E' LATERAL: 5.77
ECHO MV E/E' RATIO (AVERAGED): 6.82
ECHO MV E/E' SEPTAL: 7.87
ECHO RV TAPSE: 1.33 CM (ref 1.5–2)
ECHO TV REGURGITANT MAX VELOCITY: 272.5 CM/S
ECHO TV REGURGITANT PEAK GRADIENT: 29.7 MMHG
LVOT MG: 1.78 MMHG

## 2020-09-11 PROCEDURE — C8929 TTE W OR WO FOL WCON,DOPPLER: HCPCS

## 2020-09-11 PROCEDURE — 74011000250 HC RX REV CODE- 250: Performed by: INTERNAL MEDICINE

## 2020-09-11 PROCEDURE — 77063 BREAST TOMOSYNTHESIS BI: CPT

## 2020-09-11 PROCEDURE — 74011250636 HC RX REV CODE- 250/636: Performed by: INTERNAL MEDICINE

## 2020-09-11 RX ADMIN — PERFLUTREN 1 ML: 6.52 INJECTION, SUSPENSION INTRAVENOUS at 12:58

## 2020-09-20 ENCOUNTER — HOSPITAL ENCOUNTER (EMERGENCY)
Age: 65
Discharge: HOME | End: 2020-09-20
Attending: EMERGENCY MEDICINE
Payer: COMMERCIAL

## 2020-09-20 VITALS
HEIGHT: 64 IN | TEMPERATURE: 98.2 F | WEIGHT: 240 LBS | RESPIRATION RATE: 16 BRPM | OXYGEN SATURATION: 100 % | HEART RATE: 99 BPM | DIASTOLIC BLOOD PRESSURE: 75 MMHG | BODY MASS INDEX: 40.97 KG/M2 | SYSTOLIC BLOOD PRESSURE: 114 MMHG

## 2020-09-20 DIAGNOSIS — G57.93 NEUROPATHIC PAIN OF BOTH FEET: Primary | ICD-10-CM

## 2020-09-20 DIAGNOSIS — Z87.39 HISTORY OF GOUT: ICD-10-CM

## 2020-09-20 LAB — GLUCOSE BLD STRIP.AUTO-MCNC: 90 MG/DL (ref 70–110)

## 2020-09-20 PROCEDURE — 82962 GLUCOSE BLOOD TEST: CPT

## 2020-09-20 PROCEDURE — 99283 EMERGENCY DEPT VISIT LOW MDM: CPT

## 2020-09-20 RX ORDER — GABAPENTIN 300 MG/1
CAPSULE ORAL
Qty: 18 CAP | Refills: 0 | Status: SHIPPED | OUTPATIENT
Start: 2020-09-20

## 2020-09-20 NOTE — ED NOTES
I have reviewed discharge instructions with the patient. The patient verbalized understanding. Pt agreeable to plan of care, pt wheeled to ed lobby to meet ride.

## 2020-09-20 NOTE — LETTER
NOTIFICATION RETURN TO WORK / SCHOOL 
 
9/20/2020 12:06 PM 
 
Ms. Bruno Jaimes [address] [id number] To Whom It May Concern: 
 
Bruno Jaimes is currently under the care of [facility] EMERGENCY DEPT. She will return to work/school on: 9/22/2020 If there are questions or concerns please have the patient contact our office.  
 
 
 
Sincerely, 
 
 
 
 
 
Donnie JHAN, RN, Yvette 'DARYL' Us

## 2020-09-20 NOTE — ED PROVIDER NOTES
EMERGENCY DEPARTMENT HISTORY AND PHYSICAL EXAM    Date: 9/20/2020  Patient Name: Susan Winn    History of Presenting Illness     Chief Complaint   Patient presents with    Foot Pain         History Provided By: patient    Chief Complaint: bilateral foot pain  Duration: 4 days  Timing:  Since Wednesday  Location: bilateral feet  Quality: burning  Severity: 4/10  Modifying Factors: worse on ambulation  Associated Symptoms: None      Additional History (Context): Susan Winn is a 59 y.o. female with PMH of DM, HTN, and gout who presents with 4 day history of bilateral foot pain. Patient states her pain feels like she is walking on hot coals only when she bears weight. She states her pain is a 4/10 burning feeling that sometimes radiates to her ankle. She states she has tried Tylenol for pain without relief. She also states roughly a week ago she had bilateral ankle edema and her PCP increased her Spironolactone to 50mg. She states her swelling has gone down, but the pain still resides. She states she previously had an episode of foot pain and was diagnosed with gout but doesn't remember the medications given to treat. Patient denies fever, numbness/tingling, blurred vision, headaches, chest tightness, N/V. Patient states her DM is well controlled with insulin. Patient has no other complaints at this time. PCP: Faiza Escudero MD    Current Outpatient Medications   Medication Sig Dispense Refill    gabapentin (NEURONTIN) 300 mg capsule Take 1 pill PO on day 1, take 1 pill PO BID on day 2, take 1 pill PO TID on days 3-7 18 Cap 0    aspirin  mg tablet Take 250 mg by mouth daily.  albuterol (VENTOLIN HFA) 90 mcg/actuation inhaler Take 2 Puffs by inhalation every four (4) hours as needed for Wheezing. Inhale 1-2 puffs every 4-6 hours as needed.  losartan-hydrochlorothiazide (HYZAAR) 100-25 mg per tablet Take 1 Tab by mouth daily.       montelukast (SINGULAIR) 10 mg tablet Take 10 mg by mouth daily.  clopidogrel (PLAVIX) 75 mg tablet Take 75 mg by mouth daily.  spironolactone (ALDACTONE) 25 mg tablet Take 25 mg by mouth daily.  glimepiride (AMARYL) 4 mg tablet Take 4 mg by mouth every morning.  simvastatin (ZOCOR) 20 mg tablet Take 20 mg by mouth nightly.  insulin glargine (LANTUS) 100 unit/mL injection 39 Units by SubCUTAneous route nightly.  exenatide microspheres (BYDUREON) 2 mg serr 2 mg by SubCUTAneous route every seven (7) days.  lidocaine 5 % topical cream Apply  to affected area two (2) times daily as needed for Pain. 1 Tube 0       Past History     Past Medical History:  Past Medical History:   Diagnosis Date    COPD     Diabetes     Dyslipidemia     Hypertension     Hypertension     Menopause     Paroxysmal A-fib ([facility] [address]     PATIENT DENIES ANY HEART PROBLEMS    Sleep apnea     USES C PAP AT TIMES    Stroke ([facility] [address]     PT STATES \"QUESTIONABLE STROKE\"    SVT (supraventricular tachycardia) (HCC)     PATIENT ALSO DENIES       Past Surgical History:  Past Surgical History:   Procedure Laterality Date    COLONOSCOPY N/A 8/24/2018    COLONOSCOPY performed by Diego Andrade MD at [facility] ENDOSCOPY    HX ORTHOPAEDIC      CARPAL TUNNEL       Family History:  Family History   Problem Relation Age of Onset    Breast Cancer Maternal Aunt 61        not sure of age       Social History:  Social History     Tobacco Use    Smoking status: Never Smoker    Smokeless tobacco: Never Used   Substance Use Topics    Alcohol use: No    Drug use: No       Allergies: Allergies   Allergen Reactions    Pcn [Penicillins] Swelling    Shellfish Derived Other (comments)         Review of Systems   Review of Systems   Constitutional: Negative for chills, diaphoresis and fever. HENT: Negative. Eyes: Negative for visual disturbance. Respiratory: Negative for cough, chest tightness and shortness of breath. Cardiovascular: Negative for chest pain and palpitations. Gastrointestinal: Negative for abdominal pain, nausea and vomiting. Endocrine: Negative. Genitourinary: Negative. Musculoskeletal: Positive for arthralgias and joint swelling (bilateral ankles). Negative for back pain, neck pain and neck stiffness. Neurological: Negative for dizziness, weakness, light-headedness, numbness and headaches. Burning pain in feet   Hematological: Negative. Psychiatric/Behavioral: Negative. All other systems reviewed and are negative. All Other Systems Negative  Physical Exam     Vitals:    09/20/20 1047 09/20/20 1104   BP:  114/75   Pulse:  99   Resp: 16 16   Temp:  98.2 °F (36.8 °C)   SpO2:  100%   Weight: 108.9 kg (240 lb)    Height: 5' 4\" (1.626 m)      Physical Exam  Vitals signs and nursing note reviewed. Constitutional:       General: She is not in acute distress. Appearance: Normal appearance. She is obese. She is not ill-appearing. HENT:      Head: Normocephalic and atraumatic. Neck:      Musculoskeletal: Normal range of motion. Cardiovascular:      Rate and Rhythm: Normal rate and regular rhythm. Pulses:           Dorsalis pedis pulses are 3+ on the right side and 3+ on the left side. Posterior tibial pulses are 3+ on the right side and 3+ on the left side. Heart sounds: Normal heart sounds. No murmur. No friction rub. No gallop. Pulmonary:      Effort: No respiratory distress. Breath sounds: Normal breath sounds. No wheezing or rales. Abdominal:      General: Bowel sounds are normal. There is no distension. Palpations: Abdomen is soft. Tenderness: There is no abdominal tenderness. There is no guarding. Musculoskeletal:         General: Swelling (mild, non-pitting edema to the bilateral ankles) and tenderness (bilateral feet TTP to the dorsal and plantar surfaces.) present. No signs of injury. Right lower leg: No edema. Left lower leg: No edema.       Right foot: Decreased range of motion (due to pain). No deformity or foot drop. Left foot: Decreased range of motion (due to pain). No deformity or foot drop. Comments: Intact distal pulses bilaterally, no calf TTP or edema. No erythema noted on exam.    Feet:      Right foot:      Protective Sensation: 5 sites tested. 5 sites sensed. Skin integrity: Skin integrity normal.      Left foot:      Protective Sensation: 5 sites tested. 5 sites sensed. Skin integrity: Skin integrity normal.   Skin:     General: Skin is warm. Capillary Refill: Capillary refill takes 2 to 3 seconds. Findings: No bruising, erythema, lesion or rash. Neurological:      General: No focal deficit present. Mental Status: She is alert. Cranial Nerves: No cranial nerve deficit. Sensory: No sensory deficit. Motor: No weakness. Psychiatric:         Mood and Affect: Mood normal.         Behavior: Behavior normal.       Diagnostic Study Results     Labs -     Recent Results (from the past 12 hour(s))   GLUCOSE, POC    Collection Time: 09/20/20 11:32 AM   Result Value Ref Range    Glucose (POC) 90 70 - 110 mg/dL       Radiologic Studies -   No orders to display     CT Results  (Last 48 hours)    None        CXR Results  (Last 48 hours)    None            Medical Decision Making   I am the first provider for this patient. I reviewed the vital signs, available nursing notes, past medical history, past surgical history, family history and social history. Vital Signs-Reviewed the patient's vital signs. Records Reviewed: Christi Ruiz PA-C     Procedures:  Procedures    Provider Notes (Medical Decision Making): Impression:  Neuropathic pain in the feet    POC glucose 90, clinical presentation suggestive of likely new onset diabetic neuropathy. Will plan to start on 1 week of gabapentin with pcp follow-up this week. Pt agrees with this plan.  Christi Ruiz PA-C     MED RECONCILIATION:  No current facility-administered medications for this encounter. Current Outpatient Medications   Medication Sig    gabapentin (NEURONTIN) 300 mg capsule Take 1 pill PO on day 1, take 1 pill PO BID on day 2, take 1 pill PO TID on days 3-7    aspirin  mg tablet Take 250 mg by mouth daily.  albuterol (VENTOLIN HFA) 90 mcg/actuation inhaler Take 2 Puffs by inhalation every four (4) hours as needed for Wheezing. Inhale 1-2 puffs every 4-6 hours as needed.  losartan-hydrochlorothiazide (HYZAAR) 100-25 mg per tablet Take 1 Tab by mouth daily.  montelukast (SINGULAIR) 10 mg tablet Take 10 mg by mouth daily.  clopidogrel (PLAVIX) 75 mg tablet Take 75 mg by mouth daily.  spironolactone (ALDACTONE) 25 mg tablet Take 25 mg by mouth daily.  glimepiride (AMARYL) 4 mg tablet Take 4 mg by mouth every morning.  simvastatin (ZOCOR) 20 mg tablet Take 20 mg by mouth nightly.  insulin glargine (LANTUS) 100 unit/mL injection 39 Units by SubCUTAneous route nightly.  exenatide microspheres (BYDUREON) 2 mg serr 2 mg by SubCUTAneous route every seven (7) days.  lidocaine 5 % topical cream Apply  to affected area two (2) times daily as needed for Pain. Disposition:  D/c    DISCHARGE NOTE:   Patient is stable for discharge at this time. I have discussed all the findings from today's work up with the patient, including lab results and imaging. I have answered all questions. Rx for gabapentin given. Rest and close follow-up with the PCP recommended this week. Return to the ED immediately for any new or worsening symptoms.   April Belen Dye PA-C     Follow-up Information     Follow up With Specialties Details Why Adam Lucas MD Nephrology In 1 week  [address]  [address] [facility] [place] [address]  [phone]      [facility] EMERGENCY DEPT Emergency Medicine  As needed [address]  [phone]          Current Discharge Medication List      START taking these medications    Details   gabapentin (NEURONTIN) 300 mg capsule Take 1 pill PO on day 1, take 1 pill PO BID on day 2, take 1 pill PO TID on days 3-7  Qty: 18 Cap, Refills: 0    Associated Diagnoses: Neuropathic pain of both feet               Diagnosis     Clinical Impression:   1. Neuropathic pain of both feet    2.  History of gout

## 2020-09-20 NOTE — DISCHARGE INSTRUCTIONS
Patient Education        Learning About Peripheral Neuropathy  What is peripheral neuropathy? Peripheral neuropathy is a problem that affects the peripheral nerves. These nerves lead from the spinal cord to other parts of the body. They control your sense of touch, how you feel pain and temperature, and your muscle strength. Most of the time the problem starts in the fingers and toes. As it gets worse, it moves into the limbs. It can cause pain and loss of feeling in the feet, legs, and hands. What causes it? There are several causes:  · Diabetes. This is the most common cause. If your blood sugar is too high for too long, it can damage the nerves. · Kidney problems. These can lead to toxic substances in the blood that damage nerves. · Low levels of thyroid hormone (hypothyroidism). This may cause swelling of the tissues around the nerves, which can put pressure on them. · Infectious or inflammatory diseases. Examples are HIV and Guillain-Barré syndrome. These diseases can damage the nerves. · Peripheral nerve injury. A physical injury can damage the nerves. Injuries can be from things like falls, car crashes, or playing sports. · Overusing alcohol and not eating a healthy diet. These can lead to your body not having enough of certain vitamins, such as vitamin B-12. This can damage nerves. · Being exposed to toxic substances. These include lead, mercury, and certain medicines, such as those used for chemotherapy. Sometimes the cause isn't known. What are the symptoms? Symptoms of peripheral neuropathy can occur slowly over time. The most common ones are:  · Numbness, tightness, and tingling, especially in the legs, hands, and feet. · Loss of feeling. · Burning, shooting, or stabbing pain in the legs, hands, and feet. Often the pain is worse at night. · Weakness and loss of balance. What can happen if you have it?   If peripheral neuropathy gets worse, it can lead to a complete lack of feeling in your hands or feet. This can make you more likely to injure them. It may lead to calluses and blisters. It can also lead to bone and joint problems, infection, and ulcers. For instance, small, repeated injuries to the foot may lead to bigger problems. This can happen because you can't feel the injuries. Reduced feeling in the feet can also change your step, leading to bone or joint problems. If untreated, foot problems can become so severe that the foot or lower leg may have to be amputated. But treatment can slow down peripheral neuropathy. And it's a good idea to take care to avoid injury. How is it diagnosed? To diagnose peripheral neuropathy, your doctor will ask you about:  · Your symptoms. · Your medical history. This may include your use of alcohol, risk of HIV infection, or exposure to toxic substances. · Your family's medical history, including nerve disease. Your doctor may test how well you can feel touch, temperature, and pain. You may also have blood tests. These tests will help the doctor find out if you have conditions that can cause neuropathy. Examples are diabetes, vitamin deficiencies, thyroid disease, and kidney problems. How is it treated? Treatment for peripheral neuropathy can relieve symptoms. This is done by treating the health problem that's causing it. For example, if you have diabetes, keeping your blood sugar within your target range may help. Or maybe your body lacks certain vitamins caused by drinking too much alcohol. In that case, treatment may include eating a healthy diet, taking vitamins, and stopping alcohol use. You may have physical therapy. This can increase muscle strength and help build muscle control. Over-the-counter medicine can relieve mild nerve pain. Your doctor may also prescribe medicine to help with severe pain, numbness, tingling, and weakness. If you have neuropathy in your feet, it's a good idea to have them checked during each office visit.  This can help prevent problems. Some people find that physical therapy, acupuncture, or transcutaneous electrical nerve stimulation (TENS) helps relieve pain. Follow-up care is a key part of your treatment and safety. Be sure to make and go to all appointments, and call your doctor if you are having problems. It's also a good idea to know your test results and keep a list of the medicines you take. Where can you learn more? Go to http://margaret-leobardo.info/  Enter P130 in the search box to learn more about \"Learning About Peripheral Neuropathy. \"  Current as of: 2019               Content Version: 12.6  © 0128-0721 [employer]. Care instructions adapted under license by [employer] (which disclaims liability or warranty for this information). If you have questions about a medical condition or this instruction, always ask your healthcare professional. [address] any warranty or liability for your use of this information. Client24 Activation    Thank you for requesting access to Client24. Please follow the instructions below to securely access and download your online medical record. Client24 allows you to send messages to your doctor, view your test results, renew your prescriptions, schedule appointments, and more. How Do I Sign Up? 1. In your internet browser, go to [email or web address]  2. Click on the First Time User? Click Here link in the Sign In box. You will be redirect to the New Member Sign Up page. 3. Enter your Client24 Access Code exactly as it appears below. You will not need to use this code after youve completed the sign-up process. If you do not sign up before the expiration date, you must request a new code. Client24 Access Code: Activation code not generated  Current Client24 Status: Active (This is the date your Client24 access code will )    4.  Enter the last four digits of your Social Security Number (xxxx) and Date of Birth (mm/dd/yyyy) as indicated and click Submit. You will be taken to the next sign-up page. 5. Create a Fin Quiver ID. This will be your Fin Quiver login ID and cannot be changed, so think of one that is secure and easy to remember. 6. Create a Fin Quiver password. You can change your password at any time. 7. Enter your Password Reset Question and Answer. This can be used at a later time if you forget your password. 8. Enter your e-mail address. You will receive e-mail notification when new information is available in [address] 9. Click Sign Up. You can now view and download portions of your medical record. 10. Click the Download Summary menu link to download a portable copy of your medical information. Additional Information    If you have questions, please visit the Frequently Asked Questions section of the Fin Quiver website at [email or web address] Remember, Fin Quiver is NOT to be used for urgent needs. For medical emergencies, dial 911. Patient Education        Diabetic Neuropathy: Care Instructions  Your Care Instructions     When you have diabetes, your blood sugar level may get too high. Over time, high blood sugar levels can damage nerves. This is called diabetic neuropathy. Nerve damage can cause pain, burning, tingling, and numbness and may leave you feeling weak. The feet are often affected. When you have nerve damage in your feet, you cannot feel your feet and toes as well as normal and may not notice cuts or sores. Even a small injury can lead to a serious infection. It is very important that you follow your doctor's advice on foot care. Sometimes diabetes damages nerves that help the body function. If this happens, your blood pressure, sweating, digestion, and urination might be affected. Your doctor may give you a target blood sugar level that is higher or lower than you are used to.  Try to keep your blood sugar very close to this target level to prevent more damage. Follow-up care is a key part of your treatment and safety. Be sure to make and go to all appointments, and call your doctor if you are having problems. It's also a good idea to know your test results and keep a list of the medicines you take. How can you care for yourself at home? · Take your medicines exactly as prescribed. Call your doctor if you think you are having a problem with your medicine. It is very important that you take your insulin or diabetes pills as your doctor tells you. · Try to keep blood sugar at your target level. ? Eat a variety of healthy foods, with carbohydrate spread out in your meals. A dietitian can help you plan meals. ? Try to get at least 30 minutes of exercise on most days. ? Check your blood sugar as many times each day as your doctor recommends. · Take and record your blood pressure at home if your doctor tells you to. Learn the importance of the two measures of blood pressure (such as 130 over 80, or 130/80). To take your blood pressure at home:  ? Ask your doctor to check your blood pressure monitor to be sure it is accurate and the cuff fits you. Also ask your doctor to watch you to make sure that you are using it right. ? Do not use medicine known to raise blood pressure (such as some nasal decongestant sprays) before taking your blood pressure. ? Avoid taking your blood pressure if you have just exercised or are nervous or upset. Rest at least 15 minutes before you take a reading. · Take pain medicines exactly as directed. ? If the doctor gave you a prescription medicine for pain, take it as prescribed. ? If you are not taking a prescription pain medicine, ask your doctor if you can take an over-the-counter medicine. · Do not smoke. Smoking can increase your chance for a heart attack or stroke. If you need help quitting, talk to your doctor about stop-smoking programs and medicines. These can increase your chances of quitting for good.   · Limit alcohol to 2 drinks a day for men and 1 drink a day for women. Too much alcohol can cause health problems. · Eat small meals often, rather than 2 or 3 large meals a day. To care for your feet  · Prevent injury by wearing shoes at all times, even when you are indoors. · Do foot care as part of your daily routine. Wash your feet and then rub lotion on your feet, but not between your toes. Use a handheld mirror or magnifying mirror to inspect your feet for blisters, cuts, cracks, or sores. · Have your toenails trimmed and filed straight across. · Wear shoes and socks that fit well. Soft shoes that have good support and that fit well (such as tennis shoes) are best for your feet. · Check your shoes for any loose objects or rough edges before you put them on. · Ask your doctor to check your feet during each visit. Your doctor may notice a foot problem you have missed. · Get early treatment for any foot problem, even a minor one. When should you call for help? Call your doctor now or seek immediate medical care if:    · You have symptoms of infection, such as:  ? Increased pain, swelling, warmth, or redness. ? Red streaks leading from the area. ? Pus draining from the area. ? A fever.     · You have new or worse numbness, pain, or tingling in any part of your body. Watch closely for changes in your health, and be sure to contact your doctor if:    · You have a new problem with your feet, such as:  ? A new sore or ulcer. ? A break in the skin that is not healing after several days. ? Bleeding corns or calluses. ? An ingrown toenail.     · You do not get better as expected. Where can you learn more? Go to http://www.gray.com/  Enter V828 in the search box to learn more about \"Diabetic Neuropathy: Care Instructions. \"  Current as of: December 20, 2019               Content Version: 12.6  © 4135-9659 [employer].    Care instructions adapted under license by [employer] (which disclaims liability or warranty for this information). If you have questions about a medical condition or this instruction, always ask your healthcare professional. [address] any warranty or liability for your use of this information.

## 2020-10-09 ENCOUNTER — HOSPITAL ENCOUNTER (EMERGENCY)
Age: 65
Discharge: HOME | End: 2020-10-09
Attending: EMERGENCY MEDICINE
Payer: COMMERCIAL

## 2020-10-09 ENCOUNTER — APPOINTMENT (OUTPATIENT)
Dept: GENERAL RADIOLOGY | Age: 65
End: 2020-10-09
Attending: EMERGENCY MEDICINE
Payer: COMMERCIAL

## 2020-10-09 ENCOUNTER — APPOINTMENT (OUTPATIENT)
Dept: CT IMAGING | Age: 65
End: 2020-10-09
Attending: EMERGENCY MEDICINE
Payer: COMMERCIAL

## 2020-10-09 VITALS
HEART RATE: 98 BPM | SYSTOLIC BLOOD PRESSURE: 138 MMHG | RESPIRATION RATE: 16 BRPM | OXYGEN SATURATION: 100 % | DIASTOLIC BLOOD PRESSURE: 67 MMHG | TEMPERATURE: 98.7 F

## 2020-10-09 DIAGNOSIS — E88.9 PERIPHERAL NEUROPATHY DUE TO METABOLIC DISORDER (HCC): ICD-10-CM
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
## 2020-10-09 DIAGNOSIS — G63 PERIPHERAL NEUROPATHY DUE TO METABOLIC DISORDER (HCC): ICD-10-CM

## 2020-10-09 DIAGNOSIS — N39.0 URINARY TRACT INFECTION WITHOUT HEMATURIA, SITE UNSPECIFIED: ICD-10-CM

## 2020-10-09 LAB
ALBUMIN SERPL-MCNC: 2.8 G/DL (ref 3.4–5)
ALBUMIN/GLOB SERPL: 0.6 {RATIO} (ref 0.8–1.7)
ALP SERPL-CCNC: 77 U/L (ref 45–117)
ALT SERPL-CCNC: 17 U/L (ref 13–56)
ANION GAP SERPL CALC-SCNC: 6 MMOL/L (ref 3–18)
APPEARANCE UR: ABNORMAL
AST SERPL-CCNC: 25 U/L (ref 10–38)
BACTERIA URNS QL MICRO: ABNORMAL /HPF
BASOPHILS # BLD: 0 K/UL (ref 0–0.1)
BASOPHILS NFR BLD: 0 % (ref 0–2)
BILIRUB SERPL-MCNC: 0.4 MG/DL (ref 0.2–1)
BILIRUB UR QL: NEGATIVE
BUN SERPL-MCNC: 42 MG/DL (ref 7–18)
BUN/CREAT SERPL: 25 (ref 12–20)
CALCIUM SERPL-MCNC: 9.9 MG/DL (ref 8.5–10.1)
CHLORIDE SERPL-SCNC: 104 MMOL/L (ref 100–111)
CO2 SERPL-SCNC: 27 MMOL/L (ref 21–32)
COLOR UR: YELLOW
CREAT SERPL-MCNC: 1.7 MG/DL (ref 0.6–1.3)
DIFFERENTIAL METHOD BLD: ABNORMAL
EOSINOPHIL # BLD: 0.2 K/UL (ref 0–0.4)
EOSINOPHIL NFR BLD: 2 % (ref 0–5)
EPITH CASTS URNS QL MICRO: ABNORMAL /LPF (ref 0–5)
ERYTHROCYTE [DISTWIDTH] IN BLOOD BY AUTOMATED COUNT: 14.3 % (ref 11.6–14.5)
GLOBULIN SER CALC-MCNC: 4.7 G/DL (ref 2–4)
GLUCOSE BLD STRIP.AUTO-MCNC: 193 MG/DL (ref 70–110)
GLUCOSE SERPL-MCNC: 221 MG/DL (ref 74–99)
GLUCOSE UR STRIP.AUTO-MCNC: NEGATIVE MG/DL
HCT VFR BLD AUTO: 30.7 % (ref 35–45)
HGB BLD-MCNC: 9.9 G/DL (ref 12–16)
HGB UR QL STRIP: NEGATIVE
KETONES UR QL STRIP.AUTO: NEGATIVE MG/DL
LEUKOCYTE ESTERASE UR QL STRIP.AUTO: ABNORMAL
LYMPHOCYTES # BLD: 1.6 K/UL (ref 0.9–3.6)
LYMPHOCYTES NFR BLD: 14 % (ref 21–52)
MCH RBC QN AUTO: 28 PG (ref 24–34)
MCHC RBC AUTO-ENTMCNC: 32.2 G/DL (ref 31–37)
MCV RBC AUTO: 87 FL (ref 74–97)
MONOCYTES # BLD: 1 K/UL (ref 0.05–1.2)
MONOCYTES NFR BLD: 9 % (ref 3–10)
NEUTS SEG # BLD: 8.6 K/UL (ref 1.8–8)
NEUTS SEG NFR BLD: 75 % (ref 40–73)
NITRITE UR QL STRIP.AUTO: POSITIVE
PH UR STRIP: 5 [PH] (ref 5–8)
PLATELET # BLD AUTO: 451 K/UL (ref 135–420)
PMV BLD AUTO: 9.2 FL (ref 9.2–11.8)
POTASSIUM SERPL-SCNC: 4.9 MMOL/L (ref 3.5–5.5)
PROT SERPL-MCNC: 7.5 G/DL (ref 6.4–8.2)
PROT UR STRIP-MCNC: NEGATIVE MG/DL
RBC # BLD AUTO: 3.53 M/UL (ref 4.2–5.3)
RBC #/AREA URNS HPF: ABNORMAL /HPF (ref 0–5)
SODIUM SERPL-SCNC: 137 MMOL/L (ref 136–145)
SP GR UR REFRACTOMETRY: 1.01 (ref 1–1.03)
UROBILINOGEN UR QL STRIP.AUTO: 0.2 EU/DL (ref 0.2–1)
WBC # BLD AUTO: 11.3 K/UL (ref 4.6–13.2)
WBC URNS QL MICRO: ABNORMAL /HPF (ref 0–4)

## 2020-10-09 PROCEDURE — 81001 URINALYSIS AUTO W/SCOPE: CPT

## 2020-10-09 PROCEDURE — 93005 ELECTROCARDIOGRAM TRACING: CPT

## 2020-10-09 PROCEDURE — 71045 X-RAY EXAM CHEST 1 VIEW: CPT

## 2020-10-09 PROCEDURE — 99284 EMERGENCY DEPT VISIT MOD MDM: CPT

## 2020-10-09 PROCEDURE — 80053 COMPREHEN METABOLIC PANEL: CPT

## 2020-10-09 PROCEDURE — 70450 CT HEAD/BRAIN W/O DYE: CPT

## 2020-10-09 PROCEDURE — 85025 COMPLETE CBC W/AUTO DIFF WBC: CPT

## 2020-10-09 PROCEDURE — 82962 GLUCOSE BLOOD TEST: CPT

## 2020-10-09 RX ORDER — CEFDINIR 300 MG/1
300 CAPSULE ORAL 2 TIMES DAILY
Qty: 14 CAP | Refills: 0 | Status: SHIPPED | OUTPATIENT
Start: 2020-10-09

## 2020-10-09 NOTE — DISCHARGE INSTRUCTIONS
Patient Education        Preventing Falls: Care Instructions  Your Care Instructions    Getting around your home safely can be a challenge if you have injuries or health problems that make it easy for you to fall. Loose rugs and furniture in walkways are among the dangers for many older people who have problems walking or who have poor eyesight. People who have conditions such as arthritis, osteoporosis, or dementia also have to be careful not to fall. You can make your home safer with a few simple measures. Follow-up care is a key part of your treatment and safety. Be sure to make and go to all appointments, and call your doctor if you are having problems. It's also a good idea to know your test results and keep a list of the medicines you take. How can you care for yourself at home? Taking care of yourself  · You may get dizzy if you do not drink enough water. To prevent dehydration, drink plenty of fluids, enough so that your urine is light yellow or clear like water. Choose water and other caffeine-free clear liquids. If you have kidney, heart, or liver disease and have to limit fluids, talk with your doctor before you increase the amount of fluids you drink. · Exercise regularly to improve your strength, muscle tone, and balance. Walk if you can. Swimming may be a good choice if you cannot walk easily. · Have your vision and hearing checked each year or any time you notice a change. If you have trouble seeing and hearing, you might not be able to avoid objects and could lose your balance. · Know the side effects of the medicines you take. Ask your doctor or pharmacist whether the medicines you take can affect your balance. Sleeping pills or sedatives can affect your balance. · Limit the amount of alcohol you drink. Alcohol can impair your balance and other senses. · Ask your doctor whether calluses or corns on your feet need to be removed.  If you wear loose-fitting shoes because of calluses or corns, you can lose your balance and fall. · Talk to your doctor if you have numbness in your feet. Preventing falls at home  · Remove raised doorway thresholds, throw rugs, and clutter. Repair loose carpet or raised areas in the floor. · Move furniture and electrical cords to keep them out of walking paths. · Use nonskid floor wax, and wipe up spills right away, especially on ceramic tile floors. · If you use a walker or cane, put rubber tips on it. If you use crutches, clean the bottoms of them regularly with an abrasive pad, such as steel wool. · Keep your house well lit, especially [facility], and outside walkways. Use night-lights in areas such as hallways and bathrooms. Add extra light switches or use remote switches (such as switches that go on or off when you clap your hands) to make it easier to turn lights on if you have to get up during the night. · Install sturdy handrails on stairways. · Move items in your cabinets so that the things you use a lot are on the lower shelves (about waist level). · Keep a cordless phone and a flashlight with new batteries by your bed. If possible, put a phone in each of the main rooms of your house, or carry a cell phone in case you fall and cannot reach a phone. Or, you can wear a device around your neck or wrist. You push a button that sends a signal for help. · Wear low-heeled shoes that fit well and give your feet good support. Use footwear with nonskid soles. Check the heels and soles of your shoes for wear. Repair or replace worn heels or soles. · Do not wear socks without shoes on wood floors. · Walk on the grass when the sidewalks are slippery. If you live in an area that gets snow and ice in the winter, sprinkle salt on slippery steps and sidewalks. Preventing falls in the bath  · Install grab bars and nonskid mats inside and outside your shower or tub and near the toilet and sinks. · Use shower chairs and bath benches.   · Use a hand-held shower head that will allow you to sit while showering. · Get into a tub or shower by putting the weaker leg in first. Get out of a tub or shower with your strong side first.  · Repair loose toilet seats and consider installing a raised toilet seat to make getting on and off the toilet easier. · Keep your bathroom door unlocked while you are in the shower. Where can you learn more? Go to http://www.pulido.com/. Enter 0476 [id number] in the search box to learn more about \"Preventing Falls: Care Instructions. \"  Current as of: March 16, 2018  Content Version: 11.8  © 1820-2451 [employer]. Care instructions adapted under license by [employer] (which disclaims liability or warranty for this information). If you have questions about a medical condition or this instruction, always ask your healthcare professional. [place] [address] any warranty or liability for your use of this information. Patient Education        Diabetes and Preventing Falls: Care Instructions  Your Care Instructions     If you are an older adult who has diabetes, you may have a higher risk of falling. Complications of diabetes--such as nerve damage, foot problems, and reduced vision--may increase your risk of a fall. Some of your medicines also may add to your risk. By making your home safer, you can lower your risk of falling. Doing things to prevent diabetes complications may also help to lower your risk. You can make your home safer with a few simple measures. Follow-up care is a key part of your treatment and safety. Be sure to make and go to all appointments, and call your doctor if you are having problems. It's also a good idea to know your test results and keep a list of the medicines you take. How can you care for yourself at home? Taking care of yourself  · Keep your blood sugar at a target level (which you set with your doctor).   · Exercise regularly to improve your strength, muscle tone, and balance. Walk if you can. Swimming may be a good choice if you cannot walk easily. · Have your vision checked as often as your doctor recommends. It is usually once a year or more often if you have eye problems. · Know the side effects of the medicines you take. Ask your doctor or pharmacist whether the medicines you take can affect your balance. Sleeping pills or sedatives can affect your balance. · Limit the amount of alcohol you drink. Alcohol can impair your balance and other senses. · Have your doctor check your feet during each visit. If you have a foot problem, see your doctor. Preventing falls at home  · Remove raised doorway thresholds, throw rugs, and clutter. Repair loose carpet or raised areas in the floor. · Move furniture and electrical cords to keep them out of walking paths. · Use nonskid floor wax, and wipe up spills right away, especially on ceramic tile floors. · If you use a walker or cane, put rubber tips on it. If you use crutches, clean the bottoms of them regularly with an abrasive pad, such as steel wool. · Keep your house well lit, especially [facility], and outside walkways. Use night-lights in areas such as hallways and bathrooms. Add extra light switches or use remote switches (such as switches that go on or off when you clap your hands) to make it easier to turn lights on if you have to get up during the night. · Install sturdy handrails on stairways. Put grab bars near your shower, bathtub, and toilet. · Store household items on low shelves so that you do not have to climb or reach high. Or use a reaching device that you can get at a medical supply store. If you have to climb for something, use a step stool with handrails, or ask someone to get it for you. · Keep a cordless phone and a flashlight with new batteries by your bed. If possible, put a phone in each of the main rooms of your house, or carry a cell phone in case you fall and cannot reach a phone. Or you can wear a device around your neck or wrist. You push a button that sends a signal for help. · Wear low-heeled shoes that fit well and give your feet good support. Use footwear with nonskid soles. Check the heels and soles of your shoes for wear. Repair or replace worn heels or soles. · Do not wear socks without shoes on wood floors. · Walk on the grass when the sidewalks are slippery. If you live in an area that gets snow and ice in the winter, sprinkle salt on slippery steps and sidewalks. Where can you learn more? Go to http://www.gray.com/  Enter X601 in the search box to learn more about \"Diabetes and Preventing Falls: Care Instructions. \"  Current as of: April 15, 2020               Content Version: 12.6  © 0257-3111 [employer]. Care instructions adapted under license by "[employer] (which disclaims liability or warranty for this information). If you have questions about a medical condition or this instruction, always ask your healthcare professional. [address] any warranty or liability for your use of this information.

## 2020-10-09 NOTE — ED NOTES
Pt states she has been taking tylenol3. Took 1 today. Pt is sleepy, but alert to voice.  VSS    Difficulty gaining IV access, US at bedside

## 2020-10-09 NOTE — ED PROVIDER NOTES
EMERGENCY DEPARTMENT HISTORY AND PHYSICAL EXAM      Date: 10/9/2020  Patient Name: Dahlia Pryor    History of Presenting Illness     Chief Complaint   Patient presents with   [facility] Fall       History (Context): Dahlia Pryor is a 72 y.o. Gerianne Sinning with a complicated set of comorbid conditions, who has severe progressive peripheral neuropathy and frequent falls, who is on Plavix, who fell today secondary to what she calls foot pain. The patient has no acute complaints, but has subacute on chronic frequent falls that are severe exacerbated by the patient's peripheral neuropathy without relieving features or other associated symptoms. On review of systems, the patient denies headache, head pain, neck pain, facial pain, chest pain, back pain, abdominal pain, pelvic pain, extremity pain, numbness, weakness, tingling (outside of the numbness and tingling associated with her longstanding peripheral neuropathy)    PCP: Rolan Blackwell MD    Current Outpatient Medications   Medication Sig Dispense Refill    cefdinir (OMNICEF) 300 mg capsule Take 1 Cap by mouth two (2) times a day. 14 Cap 0    gabapentin (NEURONTIN) 300 mg capsule Take 1 pill PO on day 1, take 1 pill PO BID on day 2, take 1 pill PO TID on days 3-7 18 Cap 0    lidocaine 5 % topical cream Apply  to affected area two (2) times daily as needed for Pain. 1 Tube 0    aspirin  mg tablet Take 250 mg by mouth daily.  albuterol (VENTOLIN HFA) 90 mcg/actuation inhaler Take 2 Puffs by inhalation every four (4) hours as needed for Wheezing. Inhale 1-2 puffs every 4-6 hours as needed.  losartan-hydrochlorothiazide (HYZAAR) 100-25 mg per tablet Take 1 Tab by mouth daily.  montelukast (SINGULAIR) 10 mg tablet Take 10 mg by mouth daily.  clopidogrel (PLAVIX) 75 mg tablet Take 75 mg by mouth daily.  spironolactone (ALDACTONE) 25 mg tablet Take 25 mg by mouth daily.       glimepiride (AMARYL) 4 mg tablet Take 4 mg by mouth every morning.  simvastatin (ZOCOR) 20 mg tablet Take 20 mg by mouth nightly.  insulin glargine (LANTUS) 100 unit/mL injection 39 Units by SubCUTAneous route nightly.  exenatide microspheres (BYDUREON) 2 mg serr 2 mg by SubCUTAneous route every seven (7) days. Past History     Past Medical History:  Past Medical History:   Diagnosis Date    COPD     Diabetes     Dyslipidemia     Hypertension     Hypertension     Menopause     Paroxysmal A-fib ([facility]     PATIENT DENIES ANY HEART PROBLEMS    Sleep apnea     USES C PAP AT TIMES    Stroke ([facility] [address]     PT STATES \"QUESTIONABLE STROKE\"    SVT (supraventricular tachycardia) ([facility]CC)     PATIENT ALSO DENIES       Past Surgical History:  Past Surgical History:   Procedure Laterality Date    COLONOSCOPY N/A 8/24/2018    COLONOSCOPY performed by Mariano Spence MD at [facility] ENDOSCOPY    HX ORTHOPAEDIC      CARPAL TUNNEL       Family History:  Family History   Problem Relation Age of Onset    Breast Cancer Maternal Aunt 61        not sure of age       Social History:  Social History     Tobacco Use    Smoking status: Never Smoker    Smokeless tobacco: Never Used   Substance Use Topics    Alcohol use: No    Drug use: No       Allergies: Allergies   Allergen Reactions    Pcn [Penicillins] Swelling    Shellfish Derived Other (comments)       PMH, PSH, family history, social history, allergies reviewed with the patient with significant items noted above. Review of Systems   As per HPI, otherwise reviewed and negative. Physical Exam     Vitals:    10/09/20 0951 10/09/20 1300   BP: 132/61 138/67   Pulse: 99 98   Resp: 17 16   Temp: 98.7 °F (37.1 °C)    SpO2: 90% 100%       Gen: Well-appearing, in no acute distress  HEENT: Atraumatic, normocephalic, sclera anicteric, no self sign, no raccoon eyes, no hemotympanum, trachea is midline. Cardiovascular: Normal rate, regular rhythm, no murmurs, rubs, gallops.   Radial pulses 2+, dorsalis pedis pulses 2+  Pulmonary: No bruising or crepitus to the chest.  Bilateral breath sounds equal with equal chest rise. No respiratory distress. No stridor. Clear lungs. ABD: Soft, nontender, nondistended. Neuro: GCS 15. Alert. Normal speech. Normal mentation. Full strength and sensation throughout. Psych: Normal thought content and thought processes. : No CVA tenderness. Pelvis stable  EXT: Moves all extremities well. No cyanosis or clubbing. Legs and feet tender to palpation and decreased sensation   skin: Warm and well-perfused. Other:        Diagnostic Study Results     Labs -     No results found for this or any previous visit (from the past 12 hour(s)). Radiologic Studies -   CT HEAD WO CONT   Final Result   IMPRESSION:      No acute or significant findings. _______________       XR CHEST PORT   Final Result   IMPRESSION:      1. Mild cardiac enlargement without superimposed acute radiographic   cardiopulmonary abnormality. CT Results  (Last 48 hours)    None        CXR Results  (Last 48 hours)    None            Medical Decision Making   I am the first provider for this patient. I reviewed the vital signs, available nursing notes, past medical history, past surgical history, family history and social history. Vital Signs-Reviewed the patient's vital signs. EKG: Interpreted by myself. Records Reviewed: Personally, on initial evaluation    MDM:   Patient presents with fall as a result of her peripheral neuropathy. Exam significant for no signs of trauma. DDX considered likely in this particular patient: Traumatic brain injury (given on Plavix), balance affecting condition such as electrolyte disturbance, infection  DDX always considered in trauma patient:  facial fractures, pneumothorax, skeletal fractures, dislocations, intrathoracic or intra-abdominal bleeding or injury to organs, active bleeding, pelvic fracture, nonaccidental trauma.     Patient condition on initial evaluation: Stable    Plan:   Close observation  Cardiac monitoring    Orders as below:  Orders Placed This Encounter    CT HEAD WO CONT    XR CHEST PORT    CBC WITH AUTOMATED DIFF    COMPREHENSIVE METABOLIC PANEL    URINALYSIS W/ RFLX MICROSCOPIC    URINE MICROSCOPIC ONLY    GLUCOSE, POC    EKG, 12 LEAD, INITIAL    cefdinir (OMNICEF) 300 mg capsule        ED Course:      Patient chest radiograph negative. EKG without acute issue. CT scan negative. Work-up as above is otherwise negative with the exception of what appears to be a urinary tract infection. Patient will be treated and discharged home. Patient condition at time of disposition: Stable  DISCHARGE NOTE:   Pt has been reexamined. Patient has no new complaints, changes, or physical findings. Care plan outlined and precautions discussed. Results were reviewed with the patient. All medications were reviewed with the patient; will d/c home with antibiotics. All of pt's questions and concerns were addressed. Alarm symptoms and return precautions associated with chief complaint and evaluation were reviewed with the patient in detail. The patient demonstrated adequate understanding. Patient was instructed and agrees to follow up with PCP and nephrology, as well as to return to the ED upon further deterioration. Patient is ready to go home.   The patient is very happy with this plan    Follow-up Information     Follow up With Specialties Details Why [facility] EMERGENCY DEPT Emergency Medicine  As needed, If symptoms worsen [facility]Trev Lombardo [facility]    Jo Ann Brown MD Nephrology  As needed, If symptoms worsen [address]  [phone]            Discharge Medication List as of 10/9/2020  1:10 PM      START taking these medications    Details   cefdinir (OMNICEF) 300 mg capsule Take 1 Cap by mouth two (2) times a day., Print, Disp-14 Cap,R-0         CONTINUE these medications which have NOT CHANGED    Details   gabapentin (NEURONTIN) 300 mg capsule Take 1 pill PO on day 1, take 1 pill PO BID on day 2, take 1 pill PO TID on days 3-7, Normal, Disp-18 Cap,R-0      lidocaine 5 % topical cream Apply  to affected area two (2) times daily as needed for Pain., Print, Disp-1 Tube, R-0      aspirin  mg tablet Take 250 mg by mouth daily. , Historical Med      albuterol (VENTOLIN HFA) 90 mcg/actuation inhaler Take 2 Puffs by inhalation every four (4) hours as needed for Wheezing. Inhale 1-2 puffs every 4-6 hours as needed., Historical Med      losartan-hydrochlorothiazide (HYZAAR) 100-25 mg per tablet Take 1 Tab by mouth daily. , Historical Med      montelukast (SINGULAIR) 10 mg tablet Take 10 mg by mouth daily. , Historical Med      clopidogrel (PLAVIX) 75 mg tablet Take 75 mg by mouth daily. , Historical Med      spironolactone (ALDACTONE) 25 mg tablet Take 25 mg by mouth daily. , Historical Med      glimepiride (AMARYL) 4 mg tablet Take 4 mg by mouth every morning., Historical Med      simvastatin (ZOCOR) 20 mg tablet Take 20 mg by mouth nightly., Historical Med      insulin glargine (LANTUS) 100 unit/mL injection 39 Units by SubCUTAneous route nightly., Historical Med      exenatide microspheres (BYDUREON) 2 mg serr 2 mg by SubCUTAneous route every seven (7) days. , Historical Med             Procedures:  Procedures      Critical Care Time:     Disposition: Discharge home    Diagnosis     Clinical Impression:   1. Fall, initial encounter    2. Urinary tract infection without hematuria, site unspecified    3. Peripheral neuropathy due to metabolic disorder [facility])        Signed,  Felisa Vázquez MD  Emergency Physician  [facility]    As a voice dictation software was utilized to dictate this note, minor word transpositions can occur. I apologize for confusing wording and typographic errors. Please feel free to contact me for clarification.

## 2020-10-09 NOTE — ED NOTES
I have reviewed discharge instructions with the patient. The patient verbalized understanding.   Patient armband removed and shredded  Pt VSS, NAD

## 2020-10-09 NOTE — ED TRIAGE NOTES
Patient slid off toilet seat.   Has been complaining of neuropathy in feet lately   Feet pain is the only complaint of pain   EMS states patient was hypotensive on scene

## 2020-10-10 LAB
ATRIAL RATE: 92 BPM
CALCULATED P AXIS, ECG09: 76 DEGREES
CALCULATED R AXIS, ECG10: 27 DEGREES
CALCULATED T AXIS, ECG11: 43 DEGREES
DIAGNOSIS, 93000: NORMAL
P-R INTERVAL, ECG05: 148 MS
Q-T INTERVAL, ECG07: 328 MS
QRS DURATION, ECG06: 84 MS
QTC CALCULATION (BEZET), ECG08: 405 MS
VENTRICULAR RATE, ECG03: 92 BPM

## 2021-09-30 ENCOUNTER — HOSPITAL ENCOUNTER (OUTPATIENT)
Dept: WOMENS IMAGING | Age: 66
Discharge: HOME | End: 2021-09-30
Attending: INTERNAL MEDICINE
Payer: MEDICARE

## 2021-09-30 DIAGNOSIS — Z12.31 BREAST CANCER SCREENING BY MAMMOGRAM: ICD-10-CM

## 2021-09-30 PROCEDURE — 77063 BREAST TOMOSYNTHESIS BI: CPT

## 2022-09-22 ENCOUNTER — TRANSCRIBE ORDER (OUTPATIENT)
Dept: SCHEDULING | Age: 67
End: 2022-09-22

## 2022-09-22 DIAGNOSIS — Z12.31 SCREENING MAMMOGRAM FOR HIGH-RISK PATIENT: Primary | ICD-10-CM

## 2022-10-05 ENCOUNTER — HOSPITAL ENCOUNTER (OUTPATIENT)
Dept: WOMENS IMAGING | Age: 67
Discharge: HOME | End: 2022-10-05
Attending: STUDENT IN AN ORGANIZED HEALTH CARE EDUCATION/TRAINING PROGRAM
Payer: MEDICARE

## 2022-10-05 DIAGNOSIS — Z12.31 SCREENING MAMMOGRAM FOR HIGH-RISK PATIENT: ICD-10-CM

## 2022-10-05 PROCEDURE — 77063 BREAST TOMOSYNTHESIS BI: CPT

## 2023-10-10 ENCOUNTER — HOSPITAL ENCOUNTER (OUTPATIENT)
Dept: WOMENS IMAGING | Facility: HOSPITAL | Age: 68
Discharge: HOME | End: 2023-10-13
Payer: MEDICARE

## 2023-10-10 DIAGNOSIS — Z12.31 VISIT FOR SCREENING MAMMOGRAM: ICD-10-CM

## 2023-10-10 PROCEDURE — 77067 SCR MAMMO BI INCL CAD: CPT

## (undated) DEVICE — KIT COLON W/ 1.1OZ LUB AND 2 END

## (undated) DEVICE — DEVICE INFL 60ML 12ATM CONVENIENT LOK REL HNDL HI PRSS FLX

## (undated) DEVICE — MEDI-VAC NON-CONDUCTIVE SUCTION TUBING: Brand: CARDINAL HEALTH

## (undated) DEVICE — BASIN EMESIS 500CC ROSE 250/CS 60/PLT: Brand: MEDEGEN MEDICAL PRODUCTS, LLC

## (undated) DEVICE — KENDALL 500 SERIES DIAPHORETIC FOAM MONITORING ELECTRODE - TEAR DROP SHAPE ( 30/PK): Brand: KENDALL

## (undated) DEVICE — SYR 50ML SLIP TIP NSAF LF STRL --

## (undated) DEVICE — FLEX ADVANTAGE 1500CC: Brand: FLEX ADVANTAGE